# Patient Record
Sex: MALE | Race: WHITE | NOT HISPANIC OR LATINO | Employment: FULL TIME | URBAN - METROPOLITAN AREA
[De-identification: names, ages, dates, MRNs, and addresses within clinical notes are randomized per-mention and may not be internally consistent; named-entity substitution may affect disease eponyms.]

---

## 2017-07-12 ENCOUNTER — OFFICE VISIT (OUTPATIENT)
Dept: FAMILY MEDICINE | Facility: CLINIC | Age: 22
End: 2017-07-12
Payer: COMMERCIAL

## 2017-07-12 VITALS
BODY MASS INDEX: 19.86 KG/M2 | WEIGHT: 134.06 LBS | HEIGHT: 69 IN | RESPIRATION RATE: 16 BRPM | SYSTOLIC BLOOD PRESSURE: 110 MMHG | DIASTOLIC BLOOD PRESSURE: 60 MMHG | OXYGEN SATURATION: 99 % | HEART RATE: 75 BPM

## 2017-07-12 DIAGNOSIS — F32.A DEPRESSION, UNSPECIFIED DEPRESSION TYPE: Primary | ICD-10-CM

## 2017-07-12 PROCEDURE — 99203 OFFICE O/P NEW LOW 30 MIN: CPT | Mod: S$GLB,,, | Performed by: FAMILY MEDICINE

## 2017-07-12 PROCEDURE — 99999 PR PBB SHADOW E&M-EST. PATIENT-LVL III: CPT | Mod: PBBFAC,,, | Performed by: FAMILY MEDICINE

## 2017-07-12 RX ORDER — ESCITALOPRAM OXALATE 10 MG/1
10 TABLET ORAL DAILY
Qty: 30 TABLET | Refills: 0 | Status: SHIPPED | OUTPATIENT
Start: 2017-07-12 | End: 2017-07-19

## 2017-07-12 NOTE — PROGRESS NOTES
"Subjective:       Patient ID: Mykel Kebede is a 21 y.o. male.    Chief Complaint: Establish Care    HPI 21 year old male here for symptoms of depression. He has been evaluated by .   He was first diagnosed in 2015. He states relationship issues, family issues (parents divorce and father's death in 2010)and work stress. In 06/2017, patient had suicidal thoughts for 2 days. He states speaking to his mom helped to erase his thoughts.  Patient states when he's alone, his thoughts race about goals to make more money and stresses associated with that. Patient hangs out with friends regularly. He enjoys working on his truck. He states he sleeps 4-5 hours/night and has to wake up early due to work schedule.   Patient has adhd diagnosed at 9-10. Patient does not take medication. He stopped taking concerta and vyvanse at 15 due to feeling "like a zombie" and decreased appetite.   Patient works at [employer].   He smokes marijuana every other day. He has done [employer] in 2016. 6 beers on average a week.   Review of Systems   Constitutional: Negative for chills and fever.   Respiratory: Negative for chest tightness and shortness of breath.    Cardiovascular: Negative for chest pain, palpitations and leg swelling.   Gastrointestinal: Negative for abdominal pain, diarrhea, nausea and vomiting.   Genitourinary: Negative for discharge, dysuria, hematuria and penile pain.   Psychiatric/Behavioral: Positive for dysphoric mood. Negative for self-injury and suicidal ideas. The patient is not nervous/anxious.        Objective:      Vitals:    07/12/17 1559   BP: 110/60   Pulse: 75   Resp: 16     Physical Exam   Constitutional: He is oriented to person, place, and time. He appears well-developed and well-nourished. No distress.   HENT:   Mouth/Throat: Oropharynx is clear and moist. No oropharyngeal exudate.   Eyes: EOM are normal. Right eye exhibits no discharge. Left eye exhibits no discharge.   Neck: Normal range of " motion.   Cardiovascular: Normal rate and regular rhythm.    Pulmonary/Chest: Effort normal. He has no wheezes.   Abdominal: Soft. There is no tenderness. There is no guarding.   Lymphadenopathy:     He has no cervical adenopathy.   Neurological: He is alert and oriented to person, place, and time.   Skin: He is not diaphoretic.   Psychiatric: He has a normal mood and affect. His behavior is normal. Judgment and thought content normal.   Vitals reviewed.      Assessment:       1. Depression, unspecified depression type        Plan:       1.depression: will check labs. 's notes reviewed and patient has been diagnosed with severe depression. He currently does not have SI. He does own guns and I have strongly advised on removing them into a family members care. I will start lexapro and possible side effects were reviewed with patient. He was advised to seek medical attention and discontinue medication if he develops further Suicidal ideations.   RTC 2 weeks.   Depression, unspecified depression type  -     CBC auto differential; Future; Expected date: 07/12/2017  -     Basic metabolic panel; Future; Expected date: 07/12/2017  -     TSH; Future; Expected date: 07/12/2017    Other orders  -     escitalopram oxalate (LEXAPRO) 10 MG tablet; Take 1 tablet (10 mg total) by mouth once daily.  Dispense: 30 tablet; Refill: 0      Return in about 2 weeks (around 7/26/2017).

## 2017-07-14 ENCOUNTER — TELEPHONE (OUTPATIENT)
Dept: FAMILY MEDICINE | Facility: CLINIC | Age: 22
End: 2017-07-14

## 2017-07-14 NOTE — TELEPHONE ENCOUNTER
----- Message from Amber Leong MD sent at 7/14/2017  8:26 AM CDT -----  Normal thyroid hormone result  Normal kidney function  Normal blood counts

## 2017-07-19 ENCOUNTER — TELEPHONE (OUTPATIENT)
Dept: FAMILY MEDICINE | Facility: CLINIC | Age: 22
End: 2017-07-19

## 2017-07-19 RX ORDER — CITALOPRAM 20 MG/1
20 TABLET, FILM COATED ORAL DAILY
Qty: 30 TABLET | Refills: 0 | Status: SHIPPED | OUTPATIENT
Start: 2017-07-19 | End: 2018-07-19

## 2017-07-19 NOTE — TELEPHONE ENCOUNTER
----- Message from Leia Dill sent at 7/19/2017  4:20 PM CDT -----  Calling to see what doctor has to say about patient medicine

## 2017-07-19 NOTE — TELEPHONE ENCOUNTER
----- Message from Leia Dill sent at 7/19/2017  9:43 AM CDT -----  Mom called to say feels patient is having side effects to the new medication  --lexapro??   This is day 5 and patient is having  Anxiety  Reach mom at  [phone]

## 2017-07-19 NOTE — TELEPHONE ENCOUNTER
I have spoken to  and she states he has having mind racing thoughts and wants to quit lexapro. I have advised on that and will switch to celexa. Patient or mother will call clinic in 1 week with an update. All questions answered.

## 2017-07-24 ENCOUNTER — TELEPHONE (OUTPATIENT)
Dept: FAMILY MEDICINE | Facility: CLINIC | Age: 22
End: 2017-07-24

## 2017-08-04 ENCOUNTER — TELEPHONE (OUTPATIENT)
Dept: FAMILY MEDICINE | Facility: CLINIC | Age: 22
End: 2017-08-04

## 2017-08-04 NOTE — TELEPHONE ENCOUNTER
Spoke to pt mother explained that we need Involvment of Care signed by pt in order to exchange information with her. She verbalized understanding. I advised her to have pt call office to set up appt as he is due and so we can discuss medication.

## 2017-08-04 NOTE — TELEPHONE ENCOUNTER
----- Message from Olivia Poon sent at 8/4/2017 10:54 AM CDT -----  Contact: Domonique [phone]  Domonique lezama is calling to check medication to [facility]. Please call [phone]. And also about lab test results.

## 2017-11-13 ENCOUNTER — OFFICE VISIT (OUTPATIENT)
Dept: URGENT CARE | Facility: CLINIC | Age: 22
End: 2017-11-13
Payer: COMMERCIAL

## 2017-11-13 VITALS
RESPIRATION RATE: 18 BRPM | SYSTOLIC BLOOD PRESSURE: 134 MMHG | BODY MASS INDEX: 18.61 KG/M2 | HEIGHT: 70 IN | OXYGEN SATURATION: 98 % | DIASTOLIC BLOOD PRESSURE: 75 MMHG | WEIGHT: 130 LBS | HEART RATE: 91 BPM | TEMPERATURE: 97 F

## 2017-11-13 DIAGNOSIS — T15.92XA FOREIGN BODY OF LEFT EYE, INITIAL ENCOUNTER: Primary | ICD-10-CM

## 2017-11-13 PROCEDURE — 99203 OFFICE O/P NEW LOW 30 MIN: CPT | Mod: S$GLB,,, | Performed by: PHYSICIAN ASSISTANT

## 2017-11-13 RX ORDER — NEOMYCIN SULFATE, POLYMYXIN B SULFATE AND DEXAMETHASONE 3.5; 10000; 1 MG/ML; [USP'U]/ML; MG/ML
1 SUSPENSION/ DROPS OPHTHALMIC EVERY 6 HOURS
Qty: 5 ML | Refills: 0 | Status: SHIPPED | OUTPATIENT
Start: 2017-11-13 | End: 2017-11-18

## 2017-11-14 NOTE — PROGRESS NOTES
"Subjective:       Patient ID: Mykel Kebede is a 22 y.o. male.    Vitals:  height is 5' 10" (1.778 m) and weight is 59 kg (130 lb). His temperature is 97.1 °F (36.2 °C). His blood pressure is 134/75 and his pulse is 91. His respiration is 18 and oxygen saturation is 98%.     Chief Complaint: Eye Problem    This is a 22 y.o. male with Past Medical History:  No date: ADHD (attention deficit hyperactivity disorder)  No date: Depression   who presents today with a chief complaint of left eye (possible foreign body).         Eye Problem    The left eye is affected. This is a new problem. The current episode started today. The problem occurs constantly. The injury mechanism was a foreign body. There is no known exposure to pink eye. He does not wear contacts. Associated symptoms include eye redness and a foreign body sensation. Pertinent negatives include no blurred vision, fever, nausea, photophobia or vomiting. He has tried nothing for the symptoms.     Review of Systems   Constitution: Negative for chills and fever.   HENT: Negative for congestion.    Eyes: Positive for redness. Negative for blurred vision, pain and photophobia.   Gastrointestinal: Negative for nausea and vomiting.   Neurological: Negative for headaches.       Objective:      Physical Exam   Constitutional: He is oriented to person, place, and time. He appears well-developed and well-nourished.   HENT:   Head: Normocephalic and atraumatic.   Right Ear: External ear normal.   Left Ear: External ear normal.   Nose: Nose normal.   Mouth/Throat: Oropharynx is clear and moist.   Eyes: EOM and lids are normal. Pupils are equal, round, and reactive to light. Lids are everted and swept, no foreign bodies found. Right eye exhibits no chemosis, no discharge, no exudate and no hordeolum. No foreign body present in the right eye. Left eye exhibits no chemosis, no discharge, no exudate and no hordeolum. No foreign body present in the left eye. Right conjunctiva " is not injected. Right conjunctiva has no hemorrhage. Left conjunctiva is injected. Left conjunctiva has no hemorrhage. No scleral icterus.   Slit lamp exam:       The right eye shows no corneal abrasion, no corneal flare, no corneal ulcer, no foreign body, no hyphema, no hypopyon, no fluorescein uptake and no anterior chamber bulge.        The left eye shows no corneal abrasion, no corneal flare, no corneal ulcer, no foreign body, no hyphema, no hypopyon, no fluorescein uptake and no anterior chamber bulge.   L eye irrigated; No residual foreign body noted. No corneal abrasions noted   Neck: Trachea normal, full passive range of motion without pain and phonation normal. Neck supple.   Musculoskeletal: Normal range of motion.   Neurological: He is alert and oriented to person, place, and time.   Skin: Skin is warm, dry and intact.   Psychiatric: He has a normal mood and affect. His speech is normal and behavior is normal. Judgment and thought content normal. Cognition and memory are normal.   Nursing note and vitals reviewed.      Assessment:       1. Foreign body of left eye, initial encounter        Plan:         Foreign body of left eye, initial encounter  -     neomycin-polymyxin-dexamethasone (MAXITROL) 3.5mg/mL-10,000 unit/mL-0.1 % DrpS; Place 1 drop into the left eye every 6 (six) hours.  Dispense: 5 mL; Refill: 0        Particle in the Eye (Conjunctival Foreign Body, Resolved)  The conjunctiva is a thin membrane in the eye. It covers the white of the eye and the inside of the eyelid. A very small object, such as an eyelash or dirt, can become trapped under the eyelid. This is called a conjunctival foreign body. This can be very irritating to the eye, no matter how small the object is. If the exam shows that you no longer have a particle in your eye, any discomfort should go away within the next 24 hours.  Home care  ·   Apply a cool compress (a towel soaked in cool water) to the eye that hurts. Do this 3 to  4 times a day. It will help to reduce redness and swelling.  · Artificial tears can be used to reduce irritation and redness, unless another medicine was prescribed. Artificial tears can be purchased at [employer].  · Acetaminophen or ibuprofen can be used to control pain, unless another pain medicine was prescribed. (Note: If you have chronic liver or kidney disease, or if you have ever had a stomach ulcer or gastrointestinal bleeding, talk with your doctor before using these medicines.)  Follow-up care  Follow up with your healthcare provider, or as advised.  When to seek medical advice  Contact your healthcare provider right away if any of these occur:  · Increased swelling of the eyelid  · Increased pain or redness in the eye  · Drainage from the eye  · Redness in the skin around the eye  Date Last Reviewed: 6/14/2015  © 0525-2874 [employer]. [address] [place] [address]. All rights reserved. This information is not intended as a substitute for professional medical care. Always follow your healthcare professional's instructions.      Please follow up with your Primary care provider within 2-5 days if your signs and symptoms have not resolved or worsen.     If your condition worsens or fails to improve we recommend that you receive another evaluation at the emergency room immediately or contact your primary medical clinic to discuss your concerns.   You must understand that you have received an Urgent Care treatment only and that you may be released before all of your medical problems are known or treated. You, the patient, will arrange for follow up care as instructed.

## 2017-11-14 NOTE — PATIENT INSTRUCTIONS
Particle in the Eye (Conjunctival Foreign Body, Resolved)  The conjunctiva is a thin membrane in the eye. It covers the white of the eye and the inside of the eyelid. A very small object, such as an eyelash or dirt, can become trapped under the eyelid. This is called a conjunctival foreign body. This can be very irritating to the eye, no matter how small the object is. If the exam shows that you no longer have a particle in your eye, any discomfort should go away within the next 24 hours.  Home care  ·   Apply a cool compress (a towel soaked in cool water) to the eye that hurts. Do this 3 to 4 times a day. It will help to reduce redness and swelling.  · Artificial tears can be used to reduce irritation and redness, unless another medicine was prescribed. Artificial tears can be purchased at [employer].  · Acetaminophen or ibuprofen can be used to control pain, unless another pain medicine was prescribed. (Note: If you have chronic liver or kidney disease, or if you have ever had a stomach ulcer or gastrointestinal bleeding, talk with your doctor before using these medicines.)  Follow-up care  Follow up with your healthcare provider, or as advised.  When to seek medical advice  Contact your healthcare provider right away if any of these occur:  · Increased swelling of the eyelid  · Increased pain or redness in the eye  · Drainage from the eye  · Redness in the skin around the eye  Date Last Reviewed: 6/14/2015  © 9256-0031 [employer]. [address] [place] [address], [place] [address]. All rights reserved. This information is not intended as a substitute for professional medical care. Always follow your healthcare professional's instructions.      Please follow up with your Primary care provider within 2-5 days if your signs and symptoms have not resolved or worsen.     If your condition worsens or fails to improve we recommend that you receive another evaluation at the emergency room immediately or contact  your primary medical clinic to discuss your concerns.   You must understand that you have received an Urgent Care treatment only and that you may be released before all of your medical problems are known or treated. You, the patient, will arrange for follow up care as instructed.

## 2017-11-16 ENCOUNTER — TELEPHONE (OUTPATIENT)
Dept: URGENT CARE | Facility: CLINIC | Age: 22
End: 2017-11-16